# Patient Record
Sex: FEMALE | Race: WHITE
[De-identification: names, ages, dates, MRNs, and addresses within clinical notes are randomized per-mention and may not be internally consistent; named-entity substitution may affect disease eponyms.]

---

## 2017-01-27 ENCOUNTER — HOSPITAL ENCOUNTER (EMERGENCY)
Dept: HOSPITAL 18 - NAV ERS | Age: 47
Discharge: HOME | End: 2017-01-27
Payer: SELF-PAY

## 2017-01-27 DIAGNOSIS — F41.9: ICD-10-CM

## 2017-01-27 DIAGNOSIS — F17.210: ICD-10-CM

## 2017-01-27 DIAGNOSIS — R07.89: Primary | ICD-10-CM

## 2017-01-27 DIAGNOSIS — Z79.899: ICD-10-CM

## 2017-01-27 DIAGNOSIS — F31.9: ICD-10-CM

## 2017-01-27 LAB
ALP SERPL-CCNC: 103 U/L (ref 40–150)
ALT SERPL W P-5'-P-CCNC: 16 U/L (ref 0–55)
ANION GAP SERPL CALC-SCNC: 13 MMOL/L (ref 10–20)
AST SERPL-CCNC: 18 U/L (ref 5–34)
BILIRUB SERPL-MCNC: 0.2 MG/DL (ref 0.2–1.2)
BUN SERPL-MCNC: 16 MG/DL (ref 7–18.7)
CALCIUM SERPL-MCNC: 8.8 MG/DL (ref 7.8–10.44)
CHLORIDE SERPL-SCNC: 103 MMOL/L (ref 98–107)
CO2 SERPL-SCNC: 21 MMOL/L (ref 22–29)
CREAT CL PREDICTED SERPL C-G-VRATE: 0 ML/MIN (ref 70–130)
GLOBULIN SER CALC-MCNC: 2.6 G/DL (ref 2.4–3.5)
HCT VFR BLD CALC: 35 % (ref 36–47)
RBC # BLD AUTO: 3.75 MILL/UL (ref 4.2–5.4)
TROPONIN I SERPL DL<=0.01 NG/ML-MCNC: (no result) NG/ML (ref ?–0.03)
TROPONIN I SERPL DL<=0.01 NG/ML-MCNC: (no result) NG/ML (ref ?–0.03)
WBC # BLD AUTO: 8.1 THOU/UL (ref 4.8–10.8)

## 2017-01-27 PROCEDURE — 80053 COMPREHEN METABOLIC PANEL: CPT

## 2017-01-27 PROCEDURE — 96375 TX/PRO/DX INJ NEW DRUG ADDON: CPT

## 2017-01-27 PROCEDURE — 94760 N-INVAS EAR/PLS OXIMETRY 1: CPT

## 2017-01-27 PROCEDURE — 84484 ASSAY OF TROPONIN QUANT: CPT

## 2017-01-27 PROCEDURE — 93005 ELECTROCARDIOGRAM TRACING: CPT

## 2017-01-27 PROCEDURE — 82553 CREATINE MB FRACTION: CPT

## 2017-01-27 PROCEDURE — 96374 THER/PROPH/DIAG INJ IV PUSH: CPT

## 2017-01-27 PROCEDURE — 71010: CPT

## 2017-01-27 PROCEDURE — 85025 COMPLETE CBC W/AUTO DIFF WBC: CPT

## 2017-01-27 NOTE — RAD
SINGLE VIEW OF THE CHEST

1/27/17

 

INDICATION:

Chest pain.

 

IMPRESSION:  

The chest appears radiographically normal. 

 

COMMENTS: 

The lungs are clear. The cardiomediastinal silhouette is within normal limits. Osseous structures ar
e similar to a comparison dated 3/9/15.

 

POS: [facility]

## 2017-01-27 NOTE — PICIS
[facility]

                                EMERGENCY RECORD



TRIAGE (19:06 KASA)

TRIAGE NOTES:  Chest pains starts under left breast and

      radiates down left arm. (19:06 KASA)

PATIENT: NAME: Rose Mary Chapman, AGE: 46, GENDER: female, :

      Tue Sep 03, 1970, TIME OF GREET:  19:01, PREFERRED

      LANGUAGE: English, ETHNICITY: Not  or , ECODE BILLING

      MAP: [facility], SSN: [id number], Zip Code: [address], [place]

      WEIGHT: 63.50, PHONE: [phone], MEDICAL RECORD NUMBER:

      [id number], ACCOUNT NUMBER: [id number], PERSON ID: [id number], PCP:

      MD Merrill Katherine. (19:06 KASA)

COMPLAINT:  CHEST PAINS. (19:06 KASA)

ADMISSION: URGENCY: 2 Emergent, ADMISSION SOURCE: Home,

      TRANSPORT: CAR, BED: ER -03. (19:06 KASA)

PAIN: Patient complains of pain described as,

      dull, shooting, Location left breast /left

      arm, Pain is constant, Onset was 2017,

      No aggravating factors, No efforts tried to relieve symptoms. (19:22

      KASA)

SIRS SCORING: Heart Rate  (0), Temp range 96.8-101.1 (0),

      respiratory rate 12-24 (0), Mental Status altered: no (0). (19:22

      KASA)

TRIAGE SCREENING: Patient denies suicidal ideation, Patient

      denies presence of domestic violence. (19:22 KASA)

TREATMENTS IN PROGRESS: Treatments given Prehospital: Aspirin 81

      mg about 45 mins PTA. (19:22 KASA)

PROVIDERS: TRIAGE NURSE: Terri Temple RN. (19:06 KASA)

VITAL SIGNS: /74, Pulse 79, Resp 16, Pain 10, O2 Sat 99, on

      Room Air, Time 2017 19:05. (19:05 KASA)

PREVIOUS VISIT ALLERGIES: iodine, morphine (bulk), NSAIDS

      (Non-Steroidal Anti-Inflammatory Drug), Penicillins. (19:06 KASA)

  iodine, morphine (bulk), NSAIDS (Non-Steroidal Anti-Inflammatory Drug),

      Penicillins. (19:22 KASA)



KNOWN ALLERGIES

iodine

morphine (bulk): Reaction: Nausea, Source: Patient, - Headache;

      doesn't know if ever given with antinausea meds

NSAIDS (Non-Steroidal Anti-Inflammatory Drug): Reaction: Hives,

      Severity: Severe, Source: Patient, - "like I'm bouncing off the wall"

Penicillins



CURRENT MEDICATIONS

gabapentin: 

      CAPSULE : Strength - 300 mg : ORAL

      Patient Dose: 1 cap(s) Oral 3 times a day.2 caps at

      bedtime. (20:08 KASA)

carBAMazepine: 

      CAPSULE,EXTENDED RELEASE MULTIPHASE 12HR : Strength - 300 mg : ORAL

      Patient Dose: 1 cap(s) Oral 2 times a day. (20:10 KASA)

Zoloft: 



&a-1R&a+25V*p+0X*s3610N*c202B*c15G*c2P*p-0X&a-25V&a+1R         Name: Rose Mary Chapman  : 1970 F46 MedRec: [id number]

                             AcctNum: [id number]

  Prepared:  22:27 by Interface                 Page 1 of 14

                                      pMD

                         [facility]

                                EMERGENCY RECORD



      TABLET : Strength - 100 mg : ORAL

      Patient Dose: 2 tab(s) Oral once a day. (20:12 KASA)

BuSpar: 

      TABLET : Strength - 10 mg : ORAL

      Patient Dose: 15 mg Oral 3 times a day. (20:12 KASA)

TEGretol: 

      TABLET, CHEWABLE : Strength - 100 mg : ORAL

      Patient Dose: See Notes. (20:12 KASA)

traZODone: 

      TABLET : Strength - 100 mg : ORAL

      Patient Dose: 100 null Oral once a day (at bedtime). (20:12

      KASA)

Ambien: 

      TABLET : Strength - 10 mg : ORAL

      Patient Dose: 1 tab(s) Oral once a day (at bedtime). (20:13

      KASA)



VITAL SIGNS

VITAL SIGNS: BP: 137/74, Pulse: 79, Resp: 16, Pain: 10, O2 sat:

      99 on Room Air, Time: 2017 19:05. (19:05 KASA)

  BP: 130/70, Pulse: 74, Resp: 15, Pain: 10, O2 sat: 98 on Room Air, Time:

      2017 20:00. (20:00 KASA)

  BP: 123/71, Pulse: 73, Resp: 14, Pain: 10, O2 sat: 97 on Room Air, Time:

      2017 20:30. (20:30 KASA)

  BP: 132/56, Pulse: 73, Resp: 16, Pain: 9, O2 sat: 95 on Room Air, Time:

      2017 21:00. (21:00 KASA)

  BP: 122/69, Pulse: 71, Resp: 15, O2 sat: 96 on Room Air, Time: 2017

      21:30. (21:30 KASA)

  BP: 121/71, Pulse: 71, Resp: 20, Temp: 97.7 (Temporal), Pain: 6, O2 sat:

      97 on Room Air, Time: 2017 21:57. (21:57 KASA)



NURSING ASSESSMENT: CV WITH PROCEDURES (19:06 KASA)

CONSTITUTIONAL: Patient arrives ambulatory, Gait steady, History

      obtained from patient, Patient appears, in distress due to

      pain, Patient cooperative, Patient alert, Oriented to person,

      place and time, Skin warm, Skin dry, Skin normal in color, Mucous

      membranes pink, Mucous membranes moist, Patient complains of Chest

      pain, Chest pains starts under left breast and radiates down

      left arm. Started yesterday but got worse today.

CARDIOVASCULAR: Cardiovascular assessment findings include heart

      rate normal, Heart sounds normal.

RESPIRATORY/CHEST: Breath sounds clear, Respiratory assessment

      findings include respiratory effort easy, Respirations regular,

      Conversing normally, Neck and chest exam findings include trachea

      midline, Chest expansion equal, Chest movement symmetrical, no signs

      of distress, no associated cough noted, no associated fever.

IV: IV established, to the right antecubital, using a 20 gauge

      catheter, in one attempt, IV site prepped with chloraprep, Saline

      lock established, Flushed with normal saline (mls): 10, Labs drawn at

      time of placement, labeled in the presence of the patient and sent to



&a-1R&a+25V*p+0X*w0102V*c202B*c15G*c2P*p-0X&a-25V&a+1R         Name: AdelaHerimakeda WAHL  : 1970 F46 MedRec: [id number]

                             AcctNum: [id number]

  Prepared:  22:27 by Interface                 Page 2 of 14

                                      pMD

                         [facility]

                                EMERGENCY RECORD



      lab, Notes: Established by ERMA Penny

      1 unsuccessful attempt by ERMA Murray.

CARDIAC MONITOR: Cardiac monitoring indicated for complaint of

      chest pain, Patient placed on cardiac monitor, Heart rate:

      79, Patient placed on non-invasive blood pressure monitor,

      with disposable blood pressure cuff applied, Patient placed on

      continuous pulse oximetry, Adult/pediatric oxisensor applied, Oxygen

      saturation 100%.

EKG: EKG indicated for complaint of chest pain, 12 lead EKG

      performed on the left chest, done by first MUKESH Ahumada.



NURSING PROCEDURE: BEDSIDE RADIOLOGY

PATIENT IDENTIFIER: Patient actively involved in identification

      process, Patient's identity verified by patient stating name,

      Patient's identity verified by patient stating birth date. (19:51

      KASA)

  Patient actively involved in identification process. (20:00 KHER)

BEDSIDE RADIOLOGY: Bedside radiology performed by Jesi, Portable

      chest x-ray performed. (19:51 KASA)

  Portable chest x-ray performed. (20:00 KHER)

SAFETY: Side rails up, Cart/Stretcher in lowest position, Family

      at bedside, Call light within reach, Hospital ID band on. (19:51

      KASA)



NURSING PROCEDURE: DISCHARGE NOTE (22:00 KASA)

DISCHARGE: Patient discharged to home, in a wheelchair,

      family driving, accompanied by /wife/partner, Summary of Care

      printed/ provided, Discharge instructions given to patient, Discharge

      instructions given to WS Heimdal Jr, SO/, Simple or moderate

      discharge teaching performed, . Educated and provided handout

      regarding diagnosis of:

      Atypical chest pain

      Follow up with PCP, Prescriptions given and instructions on side

      effects given, Name of prescription(s) given: Flexeril, Above

      person(s) verbalized understanding of discharge instructions and

      follow-up care, Patient treated and evaluated by physician, Notes:

      Patient ambulated to restroom prior to leaving department.

      Steady gait. RR even and unlabored. NAD>.

BELONGINGS: Belongings and valuables with patient upon arrival to

      the Emergency Department include:, Belongings and valuables with

      patient at time of discharge include:, Belongings remain with

      patient, Valuables remain with patient.

SAFETY: Side rails up, Cart/Stretcher in lowest position, Family

      at bedside, Call light within reach, Hospital ID band on.



NURSING PROCEDURE: IV (21:57 KASA)

FOLLOW-UP SITE 1: IV discontinued, due to patient being

      discharged, catheter intact, Notes: IV discontinued. Tip intact.

      Pressure applied along with 2x2 and tape. Patient tolerated procedure

      well.



&a-1R&a+25V*p+0X*a7628A*c202B*c15G*c2P*p-0X&a-25V&a+1R         Name: Rose Mary Chapman  : 1970 F46 MedRec: [id number]

                             AcctNum: [id number]

  Prepared:  22:27 by Interface                 Page 3 of 14

                                      pMD

                         [facility]

                                EMERGENCY RECORD



SAFETY: Side rails up, Cart/Stretcher in lowest position, Family

      at bedside, Call light within reach, Hospital ID band on.



NURSING PROCEDURE: LAB DRAW (20:55 KASA)

PATIENT IDENTIFIER: Patient actively involved in identification

      process, Patient's identity verified by patient stating name,

      Patient's identity verified by patient stating birth date.

LAB DRAW: Lab draw indicated for obtaining specimens for

      evaluation, Subsequent lab draw performed, from vascular access

      device, existing IV site, R AC, After labs drawn, device flushed with

      saline, amount (mL) 10, Lab specimens labeled in the presence of the

      patient and sent to lab.

SAFETY: Side rails up, Cart/Stretcher in lowest position, Family

      at bedside, Call light within reach, Hospital ID band on.



NURSING PROCEDURE: TEACHING (22:00 KASA)

TEACHING: Prescriptions given and instructions on side effects

      given, Name of prescription(s) given: FLEXERIL (CYCLOBENZAPRINE) is a

      muscle relaxer. It is used to treat muscle pain, spasms, and

      stiffness. SIDE EFFECTS THAT YOU SHOULD REPORT TO YOUR DOCTOR OR

      HEALTH CARE PROFESSIONAL AS SOON AS POSSIBLE: allergic reactions like

      skin rash, itching or hives, swelling of the face, lips, or tongue,

      chest pain, fast heartbeat, hallucinations, seizures, vomiting. SIDE

      EFFECTS THAT USUALLY DO NOT REQUIRE MEDICAL ATTENTION (REPORT TO YOUR

      DOCTOR OR HEALTH CARE PROFESSIONAL IF THEY CONTINUE OR ARE

      BOTHERSOME): Headache., Notes: Additional information about the

      medication you were given and/or prescribed.

      Tell your doctor or healthcare professional if your symptoms do not

      start to get better or if they get worse.

      YOU MAY GET DROWSY OR DIZZY. DO NOT DRIVE, USE MACHINERY, OR DO

      ANYTHING THAT NEEDS MENTAL ALERTNESS UNTIL YOU KNOW HOW THIS MEDICINE

      AFFECTS YOU. DO NOT STAND OR SIT UP QUICKLY, ESPECIALLY IF YOU ARE AN

      OLDER PATIENT. THIS REDUCES THE RISK OF DIZZY OR FAINTING SPELLS.

      ALCOHOL MAY INTERFERE WITH THE EFFECT OF THIS MEDICINE. AVOID

      ALCOHOLIC DRINKS.

      Your mouth may get dry. Drinking water, chewing sugarless gum, or

      sucking on hard candy may help.

      How to take:

      Take this medicine by mouth with a glass of water. Follow the

      directions on the prescription label. Do not cut, crush or chew this

      medicine. If this medicine upsets your stomach, take it with food or

      milk. Take your medicine at regular intervals. Do not take it more

      often than directed.

      If you miss a dose, take it as soon as you can. If it is almost time

      for your next dose, take only that dose. Do not take double or extra

      doses.

      Let your health care provided know if you have any of these

      conditions:

         heart disease

         irregular heartbeat



&a-1R&a+25V*p+0X*j3016C*c202B*c15G*c2P*p-0X&a-25V&a+1R         Name: Rose Mary Chapman  : 1970 F46 MedRec: [id number]

                             AcctNum: [id number]

  Prepared:  22:27 by Interface                 Page 4 of 14

                                      pMD

                         [facility]

                                EMERGENCY RECORD



         liver disease

         past heart attack

         thyroid problem

       an unusual or allergic reaction to cyclobenzaprine, tricyclic

      antidepressants, lactose, other medicines, foods, dyes, or

      preservatives

         pregnant or trying to get pregnant

         breast-feeding

      What may interact with this medicine?

      Do not take this medicine with any of the following medications:

       certain medicines for fungal infections like fluconazole,

      itraconazole, ketoconazole, posaconazole, voriconazole

         cisapride

         dofetilide

         dronedarone

         droperidol

         flecainide

         grepafloxacin

         halofantrine

         levomethadyl

       MAOIs like Carbex, Eldepryl, Marplan, Nardil, and Parnate

         nilotinib

         pimozide

         probucol

         sertindole

         thioridazine

         ziprasidone

      This medicine may also interact with the following medications:

         abarelix

         alcohol

         certain medicines for cancer

       certain medicines for depression, anxiety, or psychotic disturbances

       certain medicines for infection like alfuzosin, chloroquine,

      clarithromycin, levofloxacin, mefloquine, pentamidine, troleandomycin

         certain medicines for irregular heart beat

       certain medicines used for sleep or numbness during surgery or

      procedure

         contrast dyes

         dolasetron

         guanethidine

         methadone

         octreotide

         ondansetron

       other medicines that prolong the QT interval (cause an abnormal

      heart rhythm)

         palonosetron

       phenothiazines like chlorpromazine, mesoridazine, prochlorperazine,

      thioridazine

         tramadol

         vardenafil



&a-1R&a+25V*p+0X*r8013D*c202B*c15G*c2P*p-0X&a-25V&a+1R         Name: Rose Mary Chapman  : 1970 F46 MedRec: [id number]

                             AcctNum: [id number]

  Prepared:  22:27 by Interface                 Page 5 of 14

                                      pMD

                         [facility]

                                EMERGENCY RECORD



      This list may not describe all possible interactions. Give your

      health care provider a list of all the medicines, herbs,

      non-prescription drugs, or dietary supplements you use. Also tell

      them if you smoke, drink alcohol, or use illegal drugs. Some items

      may interact with your medicine.

      PATIENT &/OR CAREGIVER VERBALIZED UNDERSTANDING OF THE TEACHING

      PROVIDED AND WAS ABLE TO DEMONSTRATE TEACHING AS EVIDENCED BY TEACH

      BACK.

  Simple or moderate teaching performed, by ERMA Melgoza, Chest Pain,

      Uncertain Cause

      Based on your exam today, the exact cause of your chest pain is not

      certain. Your condition does not seem serious at this time, and your

      pain does not appear to be coming from your heart.

      However, sometimes the signs of a serious problem take more time to

      appear. Therefore, watch for the warning signs listed below.

      Home Care:

         Rest today and avoid strenuous activity.

         Take any prescribed medicine as directed.

      Follow Up with your doctor or this facility as instructed or if you

      do not start to feel better within 24 hours.

      [NOTE: If an X-ray or EKG (cardiogram) was made, it will be reviewed

      by another specialist. You will be notified of any new findings that

      may affect your care.]

      Get Prompt Medical Attention if any of the following occur:

       A change in the type of pain: if it feels different, becomes more

      severe, lasts longer, or begins to spread into your shoulder, arm,

      neck, jaw or back

       Shortness of breath or increased pain with breathing

         Weakness, dizziness, or fainting

       Cough with dark colored sputum (phlegm) or blood

       Fever of 100.4F (38C) or higher, or as directed by your healthcare

      provider

         Swelling, pain or redness in one leg.



ORDER DETAILS



      Order Name: CARDIAC MONITOR ED, Status: Done, Time: 19:39 2017,

      User: [email or web address],

       - Ordered for: MD Forrester Stanley,

       - Entered by: MD Forrester Stanley - Fri 2017 19:37,

       - Quantity: 1,

      Order Name: Cardiac Profile w/CKMB & Troponin - I, Status: Active,

      Time: 19:37 2017, User: GIA,

       - Ordered for: MD Forrester Stanley,

       - Entered by: MD Forrester Stanley - Fri 2017 19:37,

       - Quantity: 1,

      Order Name: CBC with Differential, Status: Active, Time: 19:37

      2017, User: GIA,

       - Ordered for: MD Forrester Stanley,

       - Entered by: MD Forrester Stanley - Fri 2017 19:37,



&a-1R&a+25V*p+0X*f7492F*c202B*c15G*c2P*p-0X&a-25V&a+1R         Name: Parcelas Mandry 
Rose Mary VISH  : 1970 F46 MedRec: [id number]

                             AcctNum: [id number]

  Prepared:  22:27 by Interface                 Page 6 of 14

                                      pMD

                         [facility]

                                EMERGENCY RECORD



       - Quantity: 1,

      Order Name: Comprehensive Metabolic Panel, Status: Active, Time:

      19:37 2017, User: GIA,

       - Ordered for: MD Forrester Stanley,

       - Entered by: MD Forrester Stanley - Fri 2017 19:37,

       - Quantity: 1,

      Order Name: EKG 12 Lead in Emergency Room, Status: Active, Time:

      19:37 2017, User: GIA,

       - Ordered for: MD Forrester Stanley,

       - Entered by: MD Forrester Stanley - Fri 2017 19:37,

       - Quantity: 1,

      Order Name: ERRT Pulse Oximeter ER, Status: Active, Time: 19:37

      2017, User: GIA,

       - Ordered for: MD Forrester Stanley,

       - Entered by: MD Forrester Stanley - Fri 2017 19:37,

       - Quantity: 1,

      Order Name: SALINE LOCK, Status: Done, Time: 19:39 2017, User:

      RICKY,

       - Ordered for: MD Forrester Stanley,

       - Entered by: MD Forrester Stanley - Fri 2017 19:37,

       - Quantity: 1,

      Order Name: XR Chest 1 View Portable, Status: Active, Time: 19:37

      2017, User: GIA,

       - Ordered for: MD Forrester Stanley,

       - Entered by: MD Forrester Stanley - Fri 2017 19:37,

       - Quantity: 1.



MEDICATION ADMINISTRATION SUMMARY



      Drug Name: fentaNYL (PF) intravenous, Dose Ordered: 100 mcg, Route:

      IV Push, Status: Given, Time: 21:28 2017,

      Drug Name: *Norco, Dose Ordered: 2 tab(s), Route: Oral, Status:

      Given, Time: 20:28 2017,

      Drug Name: GI COCKTAIL, Dose Ordered: 40 mL, Route: Oral, Status:

      Given, Time: 20:06 2017,

      Drug Name: Phenergan injection, Dose Ordered: 12.5 mg, Route: IV

      Push, Status: Given, Time: 19:45 2017, *Additional information

      available in notes, Detailed record available in Medication Service

      section.



MEDICATION SERVICE

fentaNYL (PF) intravenous:  Order: fentaNYL (PF) intravenous

      (fentanyl citrate/preservative free) - Dose: 100 mcg : IV

      Push

      Schedule: Now

      Ordered by: Eric Forrester MD

      Entered by: Eric Forrester MD  21:23 ,

      Acknowledged by: Terri Temple RN  21:27

      Documented as given by: Terri Temple RN  21:28

      Patient, Medication, Dose, Route and Time verified prior to



&a-1R&a+25V*p+0X*j4208T*c202B*c15G*c2P*p-0X&a-25V&a+1R         Name: Rose Mary Chapman  : 1970 F46 MedRec: [id number]

                             AcctNum: [id number]

  Prepared:  22:27 by Interface                 Page 7 of 14

                                      pMD

                         [facility]

                                EMERGENCY RECORD



      administration.

       Amount given: 100 mcg, IV SITE #1 IVP, subsequent different

      medication, Slowly, Catheter placement confirmed via flush prior to

      administration, IV site without signs or symptoms of infiltration

      during medication administration, No swelling during administration,

      No drainage during administration, IV flushed after administration,

      Correct patient, time, route, dose and medication confirmed prior to

      administration, Patient advised of actions and side-effects prior to

      administration, Allergies confirmed and medications reviewed prior to

      administration, Patient in position of comfort, Side rails up, Cart

      in lowest position, Family at bedside.

GI COCKTAIL:  Order: GI COCKTAIL - Dose: 40 mL : Oral

      ** Lidocaine Viscous (lidocaine HCl) [10 mL]

      ** MAG-AL (magnesium hydroxide/aluminum hydroxide) [30 mL]

      Ordered by: Eric Forrester MD

      Entered by: Eric Forrester MD  19:38 ,

      Acknowledged by: Terri Temple RN  19:39

      Documented as given by: Terri Temple RN  20:06

      Patient, Medication, Dose, Route and Time verified prior to

      administration.

       Amount given: 40 ml, Site: Medication administered P.O., Correct

      patient, time, route, dose and medication confirmed prior to

      administration, Patient advised of actions and side-effects prior to

      administration, Allergies confirmed and medications reviewed prior to

      administration, Patient in position of comfort, Side rails up, Cart

      in lowest position, Family at bedside.

: Follow Up : No signs or symptoms of allergic reaction noted,

      No change in pain, No change in vomiting, Advised not to ambulate

      without assistance, Patient in position of comfort, Side rails up,

      Cart in lowest position, Family at bedside, Patient states pain has

      not changed. Feels like medication did not help. ERMD informed and

      additional medication requested. (20:18 KASA)

Norco:  Order: Norco (hydrocodone bitartrate/acetaminophen) -

      Dose: 2 tab(s) : Oral

      POTENTIAL ALLERGY REACTION: 'morphine (bulk) [morphine/morphine

      sulfate]' - Benefits outweigh risks

      Schedule: Now

      Notes: 2 

      Ordered by: Eric Forrester MD

      Entered by: Eric Forrester MD  20:21 ,

      Acknowledged by: Terri Temple RN  20:25

      Documented as given by: Terri Temple RN  20:28

      Patient, Medication, Dose, Route and Time verified prior to

      administration.

       Amount given: 2 tabs, Site: Medication administered P.O., Correct

      patient, time, route, dose and medication confirmed prior to

      administration, Patient advised of actions and side-effects prior to

      administration, Allergies confirmed and medications reviewed prior to

      administration, Patient in position of comfort, Side rails up, Cart

      in lowest position, Family at bedside.



&a-1R&a+25V*p+0X*t0310R*c202B*c15G*c2P*p-0X&a-25V&a+1R         Name: Rose Mary Chapman  : 1970 F46 MedRec: [id number]

                             AcctNum: [id number]

  Prepared:  22:27 by Interface                 Page 8 of 14

                                      pMD

                         [facility]

                                EMERGENCY RECORD



Phenergan injection:  Order: Phenergan injection (promethazine

      HCl) - Dose: 12.5 mg : IV Push

      Schedule: Now

      Ordered by: Eric Forrester MD

      Entered by: Eric Forrester MD  19:37 ,

      Acknowledged by: Terri Temple RN  19:39

      Documented as given by: Terri Temple RN  19:45

      Patient, Medication, Dose, Route and Time verified prior to

      administration.

       Amount given: 12.5 mg, Amount wasted: 12.5 mg, IV SITE #1 IVP,

      initial medication, Slowly, Catheter placement confirmed via flush

      prior to administration, IV site without signs or symptoms of

      infiltration during medication administration, No swelling during

      administration, No drainage during administration, IV flushed after

      administration, Correct patient, time, route, dose and medication

      confirmed prior to administration, Patient advised of actions and

      side-effects prior to administration, Allergies confirmed and

      medications reviewed prior to administration, Patient in position of

      comfort, Side rails up, Cart in lowest position, Family at bedside,

      0.5 ml diluted with 10 ml of NS.

: Follow Up : No signs or symptoms of allergic reaction noted,

      No change in pain, _IV SITE #1:_, Medication infusion discontinued,

      on  19:48, 5 minutes, ., Total amount infused:

      12.5 mg/ 10.5 ml, IV Line flushed after administration,

      Advised not to ambulate without assistance, Patient in position of

      comfort, Side rails up, Cart in lowest position, Family at bedside,

      Patient denies any burning, pain or discomfort with slow medication

      push and NS flush. (19:48 KASA)



HPI CHEST PAIN (19:56 SHAN)

CHIEF COMPLAINT: Patient presents for evaluation of chest

      pain, ongoing.

HISTORIAN: History provided by patient, History provided by

      patient's spouse, chest pain for about 2 hours yesterday that

      abated; then returned about 2 hrs prior to arrival today and ongoing.

      From upper center chest to neck to left arm. Has been under a lot of

      stress as well. Hx of bipolar affective disorder and on a lot of

      medications.

QUALITY: Pain is sharp in nature, described as

      stabbing.

RELIEVED BY: Patient's condition relieved by nothing.

HEART SCORE: Patients history is Slightly Suspicious

      (0), Patients ECG is normal (0), Patients age is greater

      than 45 and less than 65 (1), Patient has no risk factors known

      (0), Patients Troponin is equal to or less than 1 times the normal

      limit (0), Total 1.



ROS (19:59 SHAN)

CONSTITUTIONAL: Negative constitutional review of systems.

EYES: Negative eye review of systems.



&a-1R&a+25V*p+0X*k2983T*c202B*c15G*c2P*p-0X&a-25V&a+1R         Name: Rose Mary Chapman  : 1970 F46 MedRec: [id number]

                             AcctNum: [id number]

  Prepared:  22:27 by Interface                 Page 9 of 14

                                      pMD

                         [facility]

                                EMERGENCY RECORD



ENT: Negative ears, nose, throat review of systems.

CARDIOVASCULAR: Negative cardiovascular review of systems.

RESPIRATORY: Negative respiratory review of systems.

GI: Negative gastrointestinal review of systems.

GENITOURINARY FEMALE: Negative genitourinary review of systems.

MUSCULOSKELETAL: Negative musculoskeletal review of systems.

SKIN: Negative skin review of systems.

NEUROLOGIC: Negative neurologic review of systems.

ENDOCRINE: Negative endocrine review of systems.

NOTES: All systems reviewed, negative except as described above.



PAST MEDICAL HISTORY (19:22 KASA)

MEDICAL HISTORY:  Flu vaccine not up to date, Tetanus

      immunization up to date, Pneumococcal vaccine not up to date, Past

      medical history includes musculoskeletal disorder, chronic back pain,

      spondylolisthesis, history of infectious disease: methicillin

      resistant staphylococcus aureus.

FEMALE SURGICAL HISTORY:  Surgical history of carpal tunnel

      surgery, RIGHT HAND, Surgical history of hysterectomy, Surgical

      history of orthopedic surgery, LEFT THUMB, Surgical history of

      tonsillectomy.

PSYCHIATRIC HISTORY:  Psychiatric history includes, anxiety,

      bipolar disorder, depression, MOOD AND SLEEP DISORDER,.

SOCIAL HISTORY: Patient is a former drug user, Patient

      currently uses tobacco, smokes cigarettes, daily, Patient

      has smoked for 30 years, Patient smokes 1 pack per

      day, Lives at home, with family, Patient denies alcohol

      use, Patient currently uses drugs, abuses marijuana,.



PHYSICAL EXAM (19:59 SHAN)

CONSTITUTIONAL: Patient afebrile, Pulse normal, Blood pressure

      normal, Respiratory rate normal, Patient appears non toxic, Patient

      appears pain free, Patient alert and oriented to person, place and

      time.

HEAD: Head exam included findings of head atraumatic,

      normocephalic.

EYES: Eye exam normal, Eye exam included findings of eyelids

      normal to inspection, Pupils equally round and reactive to light,

      Extraocular muscles intact.

ENT: ENT exam normal, Pharynx exam normal, Uvula exam normal,

      Tonsil exam normal.

NECK: Neck exam normal, Neck exam included findings of normal

      range of motion, Trachea midline.

RESPIRATORY CHEST: Respiratory and chest exam normal, Chest exam

      included findings of chest movement symmetrical, Chest expansion

      equal, Percussion normal.

CARDIOVASCULAR: Cardiovascular assessment normal, Cardiovascular

      exam included findings of heart rate regular rate and rhythm, Heart

      sounds normal.

ABDOMEN FEMALE: Abdominal exam normal, Abdominal exam included



&a-1R&a+25V*p+0X*m0981O*c202B*c15G*c2P*p-0X&a-25V&a+1R         Name: Rose Mary Chapman  : 1970 F46 MedRec: [id number]

                             AcctNum: [id number]

  Prepared:  22:27 by Interface                 Page 10 of 14

                                      pMD

                         [facility]

                                EMERGENCY RECORD



      findings of abdomen nontender, Bowel sounds normal.

BACK: Back exam normal.

UPPER EXTREMITY: Upper extremity exam normal, Upper extremity

      exam included findings of inspection normal, Range of motion normal.

LOWER EXTREMITY: Lower extremity exam normal, Lower extremity

      exam included findings of inspection normal, Range of motion normal.

NEURO: Neuro exam normal.

SKIN: Skin exam normal.

PSYCHIATRIC: Psychiatric exam normal, Psychiatric exam included

      findings of patient oriented to person place and time, Normal affect,

      Judgment normal, Insight normal.



EVENTS

TRANSFER:  Triage to Emergency Emergency Room -03. ( 19:06 RICKY)

   Removed from Emergency Emergency Room -03. (22:18 RICKY)



DOCTOR NOTES

TEXT:  Normally healthy adult female with bipolar illness,

      having sharp and variable chest pain. Has been under a lot of stress.

      Pain central and into neck and left arm, but sharp and variable. Had

      it about 2 hours yesterday, resolved, then returned about 2 hours

      prior to arrival here. (19:59 SHAN)

   Extensive workup negative. Pain increased with motion of the left

      arm and shoulder. Some radicular nature; may need evaluation for

      cervical radiculopathy. (21:51 SHAN)

DATA REVIEWED: Lab data reviewed, Xray data reviewed, Reviewed

      EKG. (21:51 SHAN)



PROBLEM LIST

  No recorded problems



DIAGNOSIS (21:53 SHAN)

FINAL: PRIMARY: atypical chest pain.



DISPOSITION

PATIENT:  Disposition Type: Discharge, Disposition: *Discharge

      Home. (21:53 SHAN)

   Patient left the department. (22:18 RICKY)



INSTRUCTION (21:55 SHAN)

DISCHARGE:  ATYPICAL CHEST PAIN UNKNOWN CAUSE.

FOLLOWUP:  MD Desirae, [place], [facility] [place], [address],

      [address], [facility] [address], [phone].

SPECIAL:  1. followup with regular provider

      2. if the pain continues to run down the left arm; an mri of c-spine

      might be indicated and possible other cardiac tests

      3. return if problem worsens

      4. try the flexeril as a muscle relaxant; but don't use others with

      it (the skelaxin that is on the records).



&a-1R&a+25V*p+0X*d1537Y*c202B*c15G*c2P*p-0X&a-25V&a+1R         Name: Rose Mary Chapman  : 1970 F46 MedRec: [id number]

                             AcctNum: [id number]

  Prepared:  22:27 by Interface                 Page 11 of 14

                                      pMD

                         [facility]

                                EMERGENCY RECORD





PRESCRIPTION (21:54 SHAN)

Flexeril:  TABLET : 10 mg : ORAL : Quantity: *** 1 *** Unit:

      tab(s) Route: ORAL Schedule: every 8 hours PRN Dispense: *** 50 ***

      May substitute. Refills: *** No Refills ***.

NOTES:  No Refills.



IMAGING (20:24 KASA)

*EKG:  Image captured from scanner.



ADMIN (21:55 SHAN)

DIGITAL SIGNATURE:  MD Forrester Stanley.



RESULTS

RADIOLOGY: XR Chest 1 View Portable Observe DT: 

      19:38,

      CXRP

      SINGLE VIEW OF THE CHEST

      17



      INDICATION:

      Chest pain.



      IMPRESSION:

      The chest appears radiographically normal.



      COMMENTS:

      The lungs are clear. The cardiomediastinal silhouette is within

      normal limits. Osseous structures ar

      e similar to a comparison dated 3/9/15.



      POS: [facility]

       . (21:12 GIA)

LABORATORY: Comprehensive Metabolic Panel Collection DT:  19:37, 

      *Sodium 133 - L mmol/L, Range (136-145),

      Potassium 3.8 mmol/L, Range (3.5-5.1),

      Chloride 103 mmol/L, Range (), 

      *Carbon Dioxide 21 - L mmol/L, Range (22-29),

      Anion Gap 13 mmol/L, Range (10-20),

      BUN (Urea Nitrogen) 16 mg/dL, Range (7.0-18.7),

      Creatinine 0.92 mg/dL, Range (0.6-1.1),

      Estimated GFR-MDRD 65 , Reference Range for Estimated GFR:

       Greater than 90, mL/min/1.73 m2



      NOTE:

      The MDRD equation has not been validated for use, with the

      elderly (over 70 years of age), pregnant women, patients

      with, serious comorbid condition or persons with extremes of

      body size, muscle, mass, or nutritional status. ,



&a-1R&a+25V*p+0X*z7680D*c202B*c15G*c2P*p-0X&a-25V&a+1R         Name: Rose Mary Chapman  : 1970 F46 MedRec: [id number]

                             AcctNum: [id number]

  Prepared:  22:27 by Interface                 Page 12 of 14

                                      pMD

                         [facility]

                                EMERGENCY RECORD



      Glucose 94 mg/dL, Range (),

      Calcium 8.8 mg/dL, Range (7.8-10.44),

      Bilirubin, Total 0.2 mg/dL, Range (0.2-1.2),

      Protein, Total 6.6 g/dL, Range (6.0-8.3), NOTE: Plasma values are

      generally 0.3 to 0.5 g/dL higher

      than serum values, due to the presence of fibrinogen. ,

      Albumin 4.0 g/dL, Range (3.5-5.0),

      Globulin 2.6 g/dL, Range (2.4-3.5),

      Alb/Glob Ratio 1.5 g/dL, Range (1.2-2.2),

      Alkaline Phosphatase 103 U/L, Range (),

      AST (SGOT) 18 U/L, Range (5-34),

      ALT (SGPT) 16 U/L, Range (0-55). (19:56 GIA)

  Cardiac Profile w/CKMB & TropI Collection DT:  19:37,

      CKMB 2.0 ng/mL, Range (0-6.6),

      Troponin I Less than 0.010 ng/mL, Range (< 0.028),

      ********************* Reference Range **********************



       , 0.00 - 0.028 ng/mL Negative

       0.029 - 0.29 ng/mL , Indeterminate

       Greater or Equal to 0.3 ng/mL Strongly suggests MI

       , . (20:05 GIA)

  CBC with Differential Collection DT:  19:37,

      White Blood Cell (WBC) Count 8.1 thou/uL, Range (4.8-10.8), 

      *Red Blood Cell (RBC) Count 3.75 - L mill/uL, Range (4.20-5.40),

      

      *Hemoglobin 11.9 - L g/dL, Range (12.0-16.0), 

      *Hematocrit 35.0 - L %, Range (36.0-47.0),

      Mean Corpuscular Volume 93.3 fl, Range (81.0-99.0), 

      *Mean Corpuscular Hemoglobin 31.6 - H pg, Range (27.0-31.0),

      Mean Corpuscular HGB CONC 33.9 g/dL, Range (32.0-36.0),

      RBC Distribution Width 11.6 %, Range (11.5-14.5),

      Platelet Count 233 thou/uL, Range (130-400), 

      *Mean Platelet Volume 5.7 - L fL, Range (7.4-10.4),

      Neutrophil 61 %, Range (42-75), 

      *Band 2 - L %, Range (5-11),

      Lymphocytes 29 %, Range (21-51),

      Monocytes 6 %, Range (0-10),

      Eosinophils 1 %, Range (0-10),

      Basophils 1 %, Range (0-2),

      PLT Morphology Comment Appears Adequate ,

      RBC Morphology Normal . (20:11 SHAN)

  CK (CPK) Collection DT:  20:58,

      CK (CPK) 164 U/L, Range (). (21:24 SHAN)

  Cardiac Profile w/CKMB & TropI Collection DT:  20:58,

      CKMB 2.0 ng/mL, Range (0-6.6),

      Troponin I Less than 0.010 ng/mL, Range (< 0.028),

      ********************* Reference Range **********************



       , 0.00 - 0.028 ng/mL Negative

       0.029 - 0.29 ng/mL , Indeterminate



&a-1R&a+25V*p+0X*k4213S*c202B*c15G*c2P*p-0X&a-25V&a+1R         Name: Rose Mary Chapman  : 1970 F46 MedRec: [id number]

                             AcctNum: [id number]

  Prepared:  22:27 by Interface                 Page 13 of 14

                                      pMD

                         [facility]

                                EMERGENCY RECORD



       Greater or Equal to 0.3 ng/mL Strongly suggests MI

       , . (21:33 SHAN)

Key:

  RICKY=ERMA Temple, Terri LOPES=RITA Lacy Kayce SHAN=MD Usama, Eric































































































&a-1R&a+25V*p+0X*f7598I*c202B*c15G*c2P*p-0X&a-25V&a+1R         Name: Rose Mary Chapman  : 1970 F46 MedRec: [id number]

                             AcctNum: [id number]

  Prepared:  22:27 by Interface                 Page 14 of 14

                                      pMD

MTDD

## 2017-01-27 NOTE — [facility]RECORD
[facility]

                                [facility]



HPI CHEST PAIN (19:56 SHAN)

CHIEF COMPLAINT: Patient presents for evaluation of chest

      pain, ongoing.

HISTORIAN: History provided by patient, History provided by

      patient's spouse, chest pain for about 2 hours yesterday that

      abated; then returned about 2 hrs prior to arrival today and ongoing.

      From upper center chest to neck to left arm. Has been under a lot of

      stress as well. Hx of bipolar affective disorder and on a lot of

      medications.

QUALITY: Pain is sharp in nature, described as

      stabbing.

RELIEVED BY: Patient's condition relieved by nothing.

HEART SCORE: Patients history is Slightly Suspicious

      (0), Patients ECG is normal (0), Patients age is greater

      than 45 and less than 65 (1), Patient has no risk factors known

      (0), Patients Troponin is equal to or less than 1 times the normal

      limit (0), Total 1.



ROS (19:59 SHAN)

CONSTITUTIONAL: Negative constitutional review of systems.

EYES: Negative eye review of systems.

ENT: Negative ears, nose, throat review of systems.

CARDIOVASCULAR: Negative cardiovascular review of systems.

RESPIRATORY: Negative respiratory review of systems.

GI: Negative gastrointestinal review of systems.

GENITOURINARY FEMALE: Negative genitourinary review of systems.

MUSCULOSKELETAL: Negative musculoskeletal review of systems.

SKIN: Negative skin review of systems.

NEUROLOGIC: Negative neurologic review of systems.

ENDOCRINE: Negative endocrine review of systems.

NOTES: All systems reviewed, negative except as described above.



PAST MEDICAL HISTORY (19:22 KASA)

MEDICAL HISTORY:  Flu vaccine not up to date, Tetanus

      immunization up to date, Pneumococcal vaccine not up to date, Past

      medical history includes musculoskeletal disorder, chronic back pain,

      spondylolisthesis, history of infectious disease: methicillin

      resistant staphylococcus aureus.

FEMALE SURGICAL HISTORY:  Surgical history of carpal tunnel

      surgery, RIGHT HAND, Surgical history of hysterectomy, Surgical

      history of orthopedic surgery, LEFT THUMB, Surgical history of

      tonsillectomy.

PSYCHIATRIC HISTORY:  Psychiatric history includes, anxiety,

      bipolar disorder, depression, MOOD AND SLEEP DISORDER,.

SOCIAL HISTORY: Patient is a former drug user, Patient

      currently uses tobacco, smokes cigarettes, daily, Patient

      has smoked for 30 years, Patient smokes 1 pack per

      day, Lives at home, with family, Patient denies alcohol

      use, Patient currently uses drugs, abuses marijuana,.





&a-1R&a+25V*p+0X*y4448N*c202B*c15G*c2P*p-0X&a-25V&a+1R         Name: Rose Mary Chapman  : 1970 F46 MedRec: [id number]

                             AcctNum: [id number]

  Prepared:  22:21 by Interface                 Page 1 of 4

                                      pMD

                         [facility]

                                EMERGENCY RECORD



KNOWN ALLERGIES

iodine

morphine (bulk): Reaction: Nausea, Source: Patient, - Headache;

      doesn't know if ever given with antinausea meds

NSAIDS (Non-Steroidal Anti-Inflammatory Drug): Reaction: Hives,

      Severity: Severe, Source: Patient, - "like I'm bouncing off the wall"

Penicillins



CURRENT MEDICATIONS

gabapentin: 

      CAPSULE : Strength - 300 mg : ORAL

      Patient Dose: 1 cap(s) Oral 3 times a day.2 caps at

      bedtime. (20:08 KASA)

carBAMazepine: 

      CAPSULE,EXTENDED RELEASE MULTIPHASE 12HR : Strength - 300 mg : ORAL

      Patient Dose: 1 cap(s) Oral 2 times a day. (20:10 KASA)

Zoloft: 

      TABLET : Strength - 100 mg : ORAL

      Patient Dose: 2 tab(s) Oral once a day. (20:12 KASA)

BuSpar: 

      TABLET : Strength - 10 mg : ORAL

      Patient Dose: 15 mg Oral 3 times a day. (20:12 KASA)

TEGretol: 

      TABLET, CHEWABLE : Strength - 100 mg : ORAL

      Patient Dose: See Notes. (20:12 KASA)

traZODone: 

      TABLET : Strength - 100 mg : ORAL

      Patient Dose: 100 null Oral once a day (at bedtime). (20:12

      KASA)

Ambien: 

      TABLET : Strength - 10 mg : ORAL

      Patient Dose: 1 tab(s) Oral once a day (at bedtime). (20:13

      KASA)



VITAL SIGNS

VITAL SIGNS: BP: 137/74, Pulse: 79, Resp: 16, Pain: 10, O2 sat:

      99 on Room Air, Time: 2017 19:05. (19:05 KASA)

  BP: 130/70, Pulse: 74, Resp: 15, Pain: 10, O2 sat: 98 on Room Air, Time:

      2017 20:00. (20:00 KASA)

  BP: 123/71, Pulse: 73, Resp: 14, Pain: 10, O2 sat: 97 on Room Air, Time:

      2017 20:30. (20:30 KASA)

  BP: 132/56, Pulse: 73, Resp: 16, Pain: 9, O2 sat: 95 on Room Air, Time:

      2017 21:00. (21:00 KASA)

  BP: 122/69, Pulse: 71, Resp: 15, O2 sat: 96 on Room Air, Time: 2017

      21:30. (21:30 KASA)

  BP: 121/71, Pulse: 71, Resp: 20, Temp: 97.7 (Temporal), Pain: 6, O2 sat:

      97 on Room Air, Time: 2017 21:57. (21:57 KASA)



PHYSICAL EXAM (19:59 SHAN)

CONSTITUTIONAL: Patient afebrile, Pulse normal, Blood pressure



&a-1R&a+25V*p+0X*c5000M*c202B*c15G*c2P*p-0X&a-25V&a+1R         Name: Rose Mary Chapman  : 1970 F46 MedRec: [id number]

                             AcctNum: [id number]

  Prepared:  22:21 by Interface                 Page 2 of 4

                                      pMD

                         [facility]

                                EMERGENCY RECORD



      normal, Respiratory rate normal, Patient appears non toxic, Patient

      appears pain free, Patient alert and oriented to person, place and

      time.

HEAD: Head exam included findings of head atraumatic,

      normocephalic.

EYES: Eye exam normal, Eye exam included findings of eyelids

      normal to inspection, Pupils equally round and reactive to light,

      Extraocular muscles intact.

ENT: ENT exam normal, Pharynx exam normal, Uvula exam normal,

      Tonsil exam normal.

NECK: Neck exam normal, Neck exam included findings of normal

      range of motion, Trachea midline.

RESPIRATORY CHEST: Respiratory and chest exam normal, Chest exam

      included findings of chest movement symmetrical, Chest expansion

      equal, Percussion normal.

CARDIOVASCULAR: Cardiovascular assessment normal, Cardiovascular

      exam included findings of heart rate regular rate and rhythm, Heart

      sounds normal.

ABDOMEN FEMALE: Abdominal exam normal, Abdominal exam included

      findings of abdomen nontender, Bowel sounds normal.

BACK: Back exam normal.

UPPER EXTREMITY: Upper extremity exam normal, Upper extremity

      exam included findings of inspection normal, Range of motion normal.

LOWER EXTREMITY: Lower extremity exam normal, Lower extremity

      exam included findings of inspection normal, Range of motion normal.

NEURO: Neuro exam normal.

SKIN: Skin exam normal.

PSYCHIATRIC: Psychiatric exam normal, Psychiatric exam included

      findings of patient oriented to person place and time, Normal affect,

      Judgment normal, Insight normal.



MEDICATION ADMINISTRATION SUMMARY



      Drug Name: fentaNYL (PF) intravenous, Dose Ordered: 100 mcg, Route:

      IV Push, Status: Given, Time: 21:28 2017,

      Drug Name: *Norco, Dose Ordered: 2 tab(s), Route: Oral, Status:

      Given, Time: 20:28 2017,

      Drug Name: GI COCKTAIL, Dose Ordered: 40 mL, Route: Oral, Status:

      Given, Time: 20:06 2017,

      Drug Name: Phenergan injection, Dose Ordered: 12.5 mg, Route: IV

      Push, Status: Given, Time: 19:45 2017, *Additional information

      available in notes, Detailed record available in Medication Service

      section.



DOCTOR NOTES

TEXT:  Normally healthy adult female with bipolar illness,

      having sharp and variable chest pain. Has been under a lot of stress.

      Pain central and into neck and left arm, but sharp and variable. Had

      it about 2 hours yesterday, resolved, then returned about 2 hours

      prior to arrival here. (19:59 SHAN)



&a-1R&a+25V*p+0X*n5480W*c202B*c15G*c2P*p-0X&a-25V&a+1R         Name: Rose Mary Chapman  : 1970 F46 MedRec: [id number]

                             AcctNum: [id number]

  Prepared:  22:21 by Interface                 Page 3 of 4

                                      pMD

                         [facility]

                                EMERGENCY RECORD



   Extensive workup negative. Pain increased with motion of the left

      arm and shoulder. Some radicular nature; may need evaluation for

      cervical radiculopathy. (21:51 SHAN)

DATA REVIEWED: Lab data reviewed, Xray data reviewed, Reviewed

      EKG. (21:51 SHAN)



PROBLEM LIST

  No recorded problems



DIAGNOSIS (21:53 SHAN)

FINAL: PRIMARY: atypical chest pain.



PRESCRIPTION (21:54 SHAN)

Flexeril:  TABLET : 10 mg : ORAL : Quantity: *** 1 *** Unit:

      tab(s) Route: ORAL Schedule: every 8 hours PRN Dispense: *** 50 ***

      May substitute. Refills: *** No Refills ***.

NOTES:  No Refills.



DISPOSITION

PATIENT:  Disposition Type: Discharge, Disposition: *Discharge

      Home. (21:53 GIA)

   Patient left the department. (22:18 RICKY)

Key:

  RICKY=ERMA Temple, Terri NIELSEN=MD Usama, Eric























































&a-1R&a+25V*p+0X*g2095C*c202B*c15G*c2P*p-0X&a-25V&a+1R         Name: Adela 
Rose Mary D  : 1970 F46 MedRec: [id number]

                             AcctNum: [id number]

  Prepared:  22:21 by Interface                 Page 4 of 4

                                      pMD

MTDD

## 2017-05-02 ENCOUNTER — HOSPITAL ENCOUNTER (OUTPATIENT)
Dept: HOSPITAL 18 - NAV RAD | Age: 47
Discharge: HOME | End: 2017-05-02
Payer: COMMERCIAL

## 2017-05-02 DIAGNOSIS — M43.17: ICD-10-CM

## 2017-05-02 DIAGNOSIS — Z02.71: Primary | ICD-10-CM

## 2017-05-02 PROCEDURE — 72100 X-RAY EXAM L-S SPINE 2/3 VWS: CPT

## 2017-05-02 NOTE — RAD
THREE VIEWS LUMBOSACRAL SPINE:

 

Comparison: 5-26-16

 

History: Low back pain. 

 

FINDINGS: 

Three views of the lumbosacral spine shows grade II anterolisthesis of L5 on S1. There may be pars d
efects at L5. The other vertebral bodies demonstrate normal height and alignment without fracture or
 other areas of subluxation. Posterior facet arthrosis is seen in the lower lumbosacral spine. 

 

IMPRESSION: 

Stable spondylolisthesis of L5 on S1. 

 

POS: SUSY

## 2017-10-24 ENCOUNTER — HOSPITAL ENCOUNTER (EMERGENCY)
Dept: HOSPITAL 18 - NAV ERS | Age: 47
Discharge: HOME | End: 2017-10-24
Payer: SELF-PAY

## 2017-10-24 DIAGNOSIS — N39.0: ICD-10-CM

## 2017-10-24 DIAGNOSIS — F12.10: ICD-10-CM

## 2017-10-24 DIAGNOSIS — F41.9: ICD-10-CM

## 2017-10-24 DIAGNOSIS — I10: Primary | ICD-10-CM

## 2017-10-24 DIAGNOSIS — F17.210: ICD-10-CM

## 2017-10-24 DIAGNOSIS — G47.9: ICD-10-CM

## 2017-10-24 DIAGNOSIS — Z79.899: ICD-10-CM

## 2017-10-24 DIAGNOSIS — F31.9: ICD-10-CM

## 2017-10-24 LAB
ALBUMIN SERPL BCG-MCNC: 4.8 G/DL (ref 3.5–5)
ALP SERPL-CCNC: 131 U/L (ref 40–150)
ALT SERPL W P-5'-P-CCNC: 15 U/L (ref 8–55)
ANION GAP SERPL CALC-SCNC: 19 MMOL/L (ref 10–20)
AST SERPL-CCNC: 18 U/L (ref 5–34)
BACTERIA UR QL AUTO: (no result) HPF
BASOPHILS # BLD AUTO: 0.1 THOU/UL (ref 0–0.2)
BASOPHILS NFR BLD AUTO: 1 % (ref 0–1)
BILIRUB SERPL-MCNC: 0.4 MG/DL (ref 0.2–1.2)
BUN SERPL-MCNC: 8 MG/DL (ref 7–18.7)
CALCIUM SERPL-MCNC: 9.9 MG/DL (ref 7.8–10.44)
CHLORIDE SERPL-SCNC: 100 MMOL/L (ref 98–107)
CK MB SERPL-MCNC: 0.9 NG/ML (ref 0–6.6)
CO2 SERPL-SCNC: 17 MMOL/L (ref 22–29)
CREAT CL PREDICTED SERPL C-G-VRATE: 0 ML/MIN (ref 70–130)
DRUG SCREEN CUTOFF: (no result)
EOSINOPHIL # BLD AUTO: 0.1 THOU/UL (ref 0–0.7)
EOSINOPHIL NFR BLD AUTO: 1 % (ref 0–10)
GLOBULIN SER CALC-MCNC: 3.5 G/DL (ref 2.4–3.5)
GLUCOSE SERPL-MCNC: 100 MG/DL (ref 70–105)
HGB BLD-MCNC: 13.2 G/DL (ref 12–16)
LYMPHOCYTES # BLD AUTO: 3 THOU/UL (ref 1.2–3.4)
LYMPHOCYTES NFR BLD AUTO: 22.8 % (ref 21–51)
MCH RBC QN AUTO: 31.5 PG (ref 27–31)
MCV RBC AUTO: 95.7 FL (ref 81–99)
MEDTOX CONTROL LINE VALID?: (no result)
MONOCYTES # BLD AUTO: 0.9 THOU/UL (ref 0.11–0.59)
MONOCYTES NFR BLD AUTO: 7.1 % (ref 0–10)
NEUTROPHILS # BLD AUTO: 8.9 THOU/UL (ref 1.4–6.5)
NEUTROPHILS NFR BLD AUTO: 68.1 % (ref 42–75)
PLATELET # BLD AUTO: 348 THOU/UL (ref 130–400)
POTASSIUM SERPL-SCNC: 4 MMOL/L (ref 3.5–5.1)
PREGU CONTROL BACKGROUND?: (no result)
PREGU CONTROL BAR APPEAR?: YES
RBC # BLD AUTO: 4.18 MILL/UL (ref 4.2–5.4)
SODIUM SERPL-SCNC: 132 MMOL/L (ref 136–145)
SP GR UR STRIP: 1.01 (ref 1–1.03)
TROPONIN I SERPL DL<=0.01 NG/ML-MCNC: (no result) NG/ML (ref ?–0.03)
URNS CMNT MICRO: NO
WBC # BLD AUTO: 13.1 THOU/UL (ref 4.8–10.8)
WBC UR QL AUTO: (no result) HPF (ref 0–3)

## 2017-10-24 PROCEDURE — 93005 ELECTROCARDIOGRAM TRACING: CPT

## 2017-10-24 PROCEDURE — 85025 COMPLETE CBC W/AUTO DIFF WBC: CPT

## 2017-10-24 PROCEDURE — 96375 TX/PRO/DX INJ NEW DRUG ADDON: CPT

## 2017-10-24 PROCEDURE — 81015 MICROSCOPIC EXAM OF URINE: CPT

## 2017-10-24 PROCEDURE — 81025 URINE PREGNANCY TEST: CPT

## 2017-10-24 PROCEDURE — 96376 TX/PRO/DX INJ SAME DRUG ADON: CPT

## 2017-10-24 PROCEDURE — 81003 URINALYSIS AUTO W/O SCOPE: CPT

## 2017-10-24 PROCEDURE — 80053 COMPREHEN METABOLIC PANEL: CPT

## 2017-10-24 PROCEDURE — 70450 CT HEAD/BRAIN W/O DYE: CPT

## 2017-10-24 PROCEDURE — 82553 CREATINE MB FRACTION: CPT

## 2017-10-24 PROCEDURE — 96374 THER/PROPH/DIAG INJ IV PUSH: CPT

## 2017-10-24 PROCEDURE — 96361 HYDRATE IV INFUSION ADD-ON: CPT

## 2017-10-24 PROCEDURE — 80306 DRUG TEST PRSMV INSTRMNT: CPT

## 2017-10-24 PROCEDURE — 84484 ASSAY OF TROPONIN QUANT: CPT

## 2017-10-24 NOTE — CT
HEAD CT WITHOUT CONTRAST:

 

Date: 10-24-17 

 

Comparison: 10-11-04

 

History: Dizziness, headache, vomiting, high blood pressure. 

 

Technique: Serial axial CT imaging at 5 mm intervals from vertex through skull base without contrast
. 

 

FINDINGS: 

The imaged paranasal sinuses and mastoid air cells are well aerated. There is no displaced calvarial
 fracture. 

 

No intracranial hemorrhage, midline shift, or ventricular enlargement. 

 

IMPRESSION: 

No intracranial hemorrhage or displaced calvarial fracture. 

 

POS: HUBER

## 2017-10-28 ENCOUNTER — HOSPITAL ENCOUNTER (EMERGENCY)
Dept: HOSPITAL 18 - NAV ERS | Age: 47
End: 2017-10-28
Payer: SELF-PAY

## 2017-10-28 ENCOUNTER — HOSPITAL ENCOUNTER (OUTPATIENT)
Dept: HOSPITAL 92 - ERS | Age: 47
Setting detail: OBSERVATION
LOS: 1 days | Discharge: HOME | End: 2017-10-29
Attending: HOSPITALIST | Admitting: HOSPITALIST
Payer: SELF-PAY

## 2017-10-28 VITALS — BODY MASS INDEX: 23.7 KG/M2

## 2017-10-28 DIAGNOSIS — F39: ICD-10-CM

## 2017-10-28 DIAGNOSIS — F41.9: ICD-10-CM

## 2017-10-28 DIAGNOSIS — Z88.0: ICD-10-CM

## 2017-10-28 DIAGNOSIS — M62.81: ICD-10-CM

## 2017-10-28 DIAGNOSIS — G47.9: ICD-10-CM

## 2017-10-28 DIAGNOSIS — Z91.041: ICD-10-CM

## 2017-10-28 DIAGNOSIS — Z90.710: ICD-10-CM

## 2017-10-28 DIAGNOSIS — F31.9: ICD-10-CM

## 2017-10-28 DIAGNOSIS — R53.1: ICD-10-CM

## 2017-10-28 DIAGNOSIS — Z88.5: ICD-10-CM

## 2017-10-28 DIAGNOSIS — Z88.6: ICD-10-CM

## 2017-10-28 DIAGNOSIS — F12.10: ICD-10-CM

## 2017-10-28 DIAGNOSIS — Z98.890: ICD-10-CM

## 2017-10-28 DIAGNOSIS — R44.1: ICD-10-CM

## 2017-10-28 DIAGNOSIS — Z79.899: ICD-10-CM

## 2017-10-28 DIAGNOSIS — R51: Primary | ICD-10-CM

## 2017-10-28 DIAGNOSIS — I10: ICD-10-CM

## 2017-10-28 DIAGNOSIS — J06.9: ICD-10-CM

## 2017-10-28 DIAGNOSIS — F17.210: ICD-10-CM

## 2017-10-28 LAB
ALBUMIN SERPL BCG-MCNC: 4.1 G/DL (ref 3.5–5)
ALP SERPL-CCNC: 111 U/L (ref 40–150)
ALT SERPL W P-5'-P-CCNC: 11 U/L (ref 8–55)
ANION GAP SERPL CALC-SCNC: 12 MMOL/L (ref 10–20)
APTT PPP: 28.1 SEC (ref 22.9–36.1)
AST SERPL-CCNC: 18 U/L (ref 5–34)
BASOPHILS # BLD AUTO: 0.2 THOU/UL (ref 0–0.2)
BASOPHILS NFR BLD AUTO: 1.8 % (ref 0–1)
BILIRUB SERPL-MCNC: 0.2 MG/DL (ref 0.2–1.2)
BUN SERPL-MCNC: 7 MG/DL (ref 7–18.7)
CALCIUM SERPL-MCNC: 8.9 MG/DL (ref 7.8–10.44)
CHLORIDE SERPL-SCNC: 103 MMOL/L (ref 98–107)
CO2 SERPL-SCNC: 24 MMOL/L (ref 22–29)
CREAT CL PREDICTED SERPL C-G-VRATE: 0 ML/MIN (ref 70–130)
EOSINOPHIL # BLD AUTO: 0.4 THOU/UL (ref 0–0.7)
EOSINOPHIL NFR BLD AUTO: 4.4 % (ref 0–10)
GLOBULIN SER CALC-MCNC: 2.7 G/DL (ref 2.4–3.5)
GLUCOSE SERPL-MCNC: 77 MG/DL (ref 70–105)
HGB BLD-MCNC: 11.5 G/DL (ref 12–16)
INR PPP: 1
LAVENDER: (no result)
LYMPHOCYTES # BLD AUTO: 2.7 THOU/UL (ref 1.2–3.4)
LYMPHOCYTES NFR BLD AUTO: 28.6 % (ref 21–51)
Lab: (no result)
MCH RBC QN AUTO: 32.6 PG (ref 27–31)
MCV RBC AUTO: 95.8 FL (ref 81–99)
MONOCYTES # BLD AUTO: 0.9 THOU/UL (ref 0.11–0.59)
MONOCYTES NFR BLD AUTO: 8.8 % (ref 0–10)
NEUTROPHILS # BLD AUTO: 5.4 THOU/UL (ref 1.4–6.5)
NEUTROPHILS NFR BLD AUTO: 56.4 % (ref 42–75)
PLATELET # BLD AUTO: 265 THOU/UL (ref 130–400)
POTASSIUM SERPL-SCNC: 3.7 MMOL/L (ref 3.5–5.1)
PROTHROMBIN TIME: 13.3 SEC (ref 12–14.7)
RBC # BLD AUTO: 3.53 MILL/UL (ref 4.2–5.4)
RBC # CSF MANUAL: 1 /CUMM
SODIUM SERPL-SCNC: 135 MMOL/L (ref 136–145)
SPECIMEN SOURCE FLD: (no result)
WBC # BLD AUTO: 9.6 THOU/UL (ref 4.8–10.8)
WBC # CSF MANUAL: 1 /CUMM (ref 0–5)

## 2017-10-28 PROCEDURE — 36415 COLL VENOUS BLD VENIPUNCTURE: CPT

## 2017-10-28 PROCEDURE — 81003 URINALYSIS AUTO W/O SCOPE: CPT

## 2017-10-28 PROCEDURE — 85025 COMPLETE CBC W/AUTO DIFF WBC: CPT

## 2017-10-28 PROCEDURE — 96361 HYDRATE IV INFUSION ADD-ON: CPT

## 2017-10-28 PROCEDURE — 84157 ASSAY OF PROTEIN OTHER: CPT

## 2017-10-28 PROCEDURE — 80053 COMPREHEN METABOLIC PANEL: CPT

## 2017-10-28 PROCEDURE — 96365 THER/PROPH/DIAG IV INF INIT: CPT

## 2017-10-28 PROCEDURE — 96375 TX/PRO/DX INJ NEW DRUG ADDON: CPT

## 2017-10-28 PROCEDURE — 85730 THROMBOPLASTIN TIME PARTIAL: CPT

## 2017-10-28 PROCEDURE — G0378 HOSPITAL OBSERVATION PER HR: HCPCS

## 2017-10-28 PROCEDURE — 89051 BODY FLUID CELL COUNT: CPT

## 2017-10-28 PROCEDURE — 80306 DRUG TEST PRSMV INSTRMNT: CPT

## 2017-10-28 PROCEDURE — 82945 GLUCOSE OTHER FLUID: CPT

## 2017-10-28 PROCEDURE — 96372 THER/PROPH/DIAG INJ SC/IM: CPT

## 2017-10-28 PROCEDURE — 96374 THER/PROPH/DIAG INJ IV PUSH: CPT

## 2017-10-28 PROCEDURE — 84443 ASSAY THYROID STIM HORMONE: CPT

## 2017-10-28 PROCEDURE — 96376 TX/PRO/DX INJ SAME DRUG ADON: CPT

## 2017-10-28 PROCEDURE — 62270 DX LMBR SPI PNXR: CPT

## 2017-10-28 PROCEDURE — 85610 PROTHROMBIN TIME: CPT

## 2017-10-28 PROCEDURE — 70450 CT HEAD/BRAIN W/O DYE: CPT

## 2017-10-28 PROCEDURE — 82140 ASSAY OF AMMONIA: CPT

## 2017-10-28 NOTE — CT
EXAM:

NONCONTRAST HEAD CT

10/28/17

 

COMPARISON:  

10/24/17

 

HISTORY: 

Dizziness. Headache. Hypertension. 

 

TECHNIQUE:  

Noncontrast head CT is performed from skull base to skull vertex. 

 

FINDINGS:  

No parenchymal hemorrhage. No extra-axial hematoma. No midline shift. Basilar cisterns are patent. B
rain volume, age appropriate. Cortical gray-white matter differentiation is preserved. 

 

The ventricles and sulci are patent and symmetric. The calvarium is intact. Minimal mucosal thickeni
ng of the ethmoid air cells, right sphenoid sinus, and right maxillary sinus. Adequate mastoid air c
ell aeration.

 

IMPRESSION:  

No acute intracranial process. 

 

POS: [facility]

## 2017-10-29 VITALS — TEMPERATURE: 98.6 F

## 2017-10-29 VITALS — SYSTOLIC BLOOD PRESSURE: 140 MMHG | DIASTOLIC BLOOD PRESSURE: 80 MMHG

## 2017-10-29 LAB
ALP SERPL-CCNC: 107 U/L (ref 40–150)
ALT SERPL W P-5'-P-CCNC: 15 U/L (ref 8–55)
ANION GAP SERPL CALC-SCNC: 11 MMOL/L (ref 10–20)
AST SERPL-CCNC: 19 U/L (ref 5–34)
BASOPHILS # BLD AUTO: 0 THOU/UL (ref 0–0.2)
BASOPHILS NFR BLD AUTO: 0.3 % (ref 0–1)
BILIRUB SERPL-MCNC: 0.2 MG/DL (ref 0.2–1.2)
BUN SERPL-MCNC: 6 MG/DL (ref 7–18.7)
CALCIUM SERPL-MCNC: 9.2 MG/DL (ref 7.8–10.44)
CHLORIDE SERPL-SCNC: 109 MMOL/L (ref 98–107)
CO2 SERPL-SCNC: 23 MMOL/L (ref 22–29)
CREAT CL PREDICTED SERPL C-G-VRATE: 0 ML/MIN (ref 70–130)
EOSINOPHIL # BLD AUTO: 0 THOU/UL (ref 0–0.7)
EOSINOPHIL NFR BLD AUTO: 0.5 % (ref 0–10)
GLOBULIN SER CALC-MCNC: 2.7 G/DL (ref 2.4–3.5)
HCT VFR BLD CALC: 33.3 % (ref 36–47)
HYALINE CASTS #/AREA URNS LPF: (no result) LPF
LYMPHOCYTES # BLD: 0.9 THOU/UL (ref 1.2–3.4)
LYMPHOCYTES NFR BLD AUTO: 13.8 % (ref 21–51)
MONOCYTES # BLD AUTO: 0.1 THOU/UL (ref 0.11–0.59)
MONOCYTES NFR BLD AUTO: 1.2 % (ref 0–10)
NEUTROPHILS # BLD AUTO: 5.4 THOU/UL (ref 1.4–6.5)
RBC # BLD AUTO: 3.45 MILL/UL (ref 4.2–5.4)
WBC # BLD AUTO: 6.5 THOU/UL (ref 4.8–10.8)

## 2017-10-29 NOTE — DIS
DATE OF ADMISSION:  10/29/2017

 

DATE OF DISCHARGE:  10/29/2017

 

DISCHARGE DISPOSITION:  To home.

 

PRIMARY DISCHARGE DIAGNOSES:  Headache with upper respiratory infection, likely 
viral with sinusitis, marijuana abuse, and hypertension.

 

PROCEDURES DONE DURING HOSPITALIZATION:  H and H 11 and 33 and platelet count 
268.  TSH 2.2.  Urine drug screen is positive for benzodiazepines.  CT brain 
done on 10/28/2017 showed no acute intracranial process.  Lumbar puncture done 
on 02/01, shows it to be clear and colorless, white count of 1, RBC 1, had a  
glucose of 57 and total protein of 35.  Her urine drug screen was positive for 
marijuana on 10/24/2017.

 

DISCHARGE MEDICATIONS:  Zoloft 100 mg p.o. daily, buspirone 15 mg p.o. twice 
daily, carbamazepine 300 mg p.o. twice daily, Mucinex 600 mg p.o. twice daily, 
Levaquin 500 mg p.o. daily for 6 days, hydrochlorothiazide 12.5 mg p.o. daily, 
gabapentin 300 mg p.o. twice daily, albuterol inhaler q.6 hourly p.r.n.

 

ALLERGIES:  MORPHINE, NSAIDS, IODINE, PENICILLIN.

 

BRIEF COURSE DURING HOSPITALIZATION:  Patient initially came in with complaints 
of headache and had also complained of left upper and lower extremity weakness.
  The patient has had cough with nasal stuffiness and postnasal discharge as 
well.  She has had an initial lumbar puncture done, which did not reveal any 
acute abnormality.  She has had a CT brain done, which showed no acute 
intracranial abnormality.  The patient was scheduled for MRI brain, but she did 
not want to do it despite preprocedure Ativan.  Likely, her headache is due to 
upper respiratory infection with sinusitis.  She was counseled with regard to 
cessation of smoking and marijuana.  She needs to continue Levaquin, Mucinex, 
and Tylenol to help with her sinusitis.  If her symptoms were to get worse, she 
is advised to come to the nearest emergency room.  She is otherwise 
hemodynamically stable and neurologically stable.  She is ambulating in the 
room.  Please see a face-to-face documentation on [employer] for the day of 
discharge.

 

[facility]

## 2017-10-29 NOTE — HP
DATE OF ADMISSION:  10/29/2017

 

CHIEF COMPLAINT:  Headache.

 

HISTORY OF PRESENT ILLNESS:  Patient is a 47 years old female with past medical history of hypertens
ion, initially went to outside ER because of the headache and the left upper and lower extremity wea
kness.  The patient's blood pressure at home was elevated and she has annoying headache.  Headache i
s dull kind, constant all over the head, associated with nausea and vomiting also, severe headache. 
 Patient went to outside ER.  The patient was given Decadron.  Following the Decadron, the patient w
as on Benadryl.  The patient became confused, so patient was transferred here for further evaluation
.  By the time, the patient came to the ER, the confusion did improve and the patient was given Zofr
an for nausea and Tylenol for the headache and the headache did improve in our ER.  Denies any pain 
at this time.  Patient says she did have some left upper and lower extremity weakness with the heada
puneet, but resolved at this time.  Denies any trouble breathing, denies any chest pain, denies any pal
pitation, denies dizziness.  The patient complains of cough for the past few days, positive for some
 sputum production.

 

PAST MEDICAL HISTORY:  Hypertension.

 

PAST SURGICAL HISTORY:  Tonsillectomy, hysterectomy, and hand surgery.

 

SOCIAL HISTORY:  Positive for smoking, denies alcohol, denies any drugs.

 

FAMILY HISTORY:  Positive for stroke.

 

REVIEW OF SYSTEMS:  Constitutional:  Denies any fever, denies any chills.  Eyes:  No vision problems
.  Ears:  Denies any hearing loss.  Neck:  Denies any neck pain.  Cardiovascular System:  Denies any
 chest pain.  Respiratory System:  Positive for cough.  Cranial Nerve System:  Denies syncope.  Posi
tive for headache.  Positive for left upper and lower extremity weakness.  Integumentary:  Denies an
y rash.  Psychiatric:  Denies depression/anxiety.  Genitourinary:  No any dysuria.  All other review
 of systems is reviewed and is negative.

 

PHYSICAL EXAMINATION:

CONSTITUTIONAL/VITAL SIGNS:  At the time of H\T\P performed, temperature 98.2, heart rate 77, respir
ations 16, blood pressure is 122/69.

GENERAL:  The patient appears comfortable.

HEENT:  Anterior nares patent.  Nose normal.  Ears normal.  Teeth intact.  Tongue is moist.

NECK:  Supple.  No JVD.

CARDIOVASCULAR SYSTEM:  S1, S2 present.  Regular rate and rhythm.  No murmurs, no rubs, no gallops.

RESPIRATORY SYSTEM:  No wheezing, no rhonchi.  Breath sounds present bilaterally.

GASTROINTESTINAL:  Abdomen is soft, nontender, no guarding, no organomegaly, no masses felt.

MUSCULOSKELETAL:  No edema.

INTEGUMENT:  No rashes seen.

CRANIAL NERVE SYSTEM:  Cranial nerves intact.  Speech clear.

PSYCHIATRIC:  Mood is appropriate at this time.  Strength intact.

 

LABORATORY DATA:  At the time of H\T\P performed, white count 6.5, hemoglobin 9.3, platelet count 26
8.  BMP showed sodium 139, potassium 4, chloride 109, CO2 of 23, BUN of 16.6, AST 19, ALT 15, alkali
ne phosphatase 107, serum total protein 6.6.  Toxicology is positive for benzos.  UA positive for sp
ecific gravity 1.003.

 

CT head, no acute disease in the outside ER.

 

ASSESSMENT AND PLAN:  The patient is a 47 years old female.

1.  Transient ischemic attack.  Plan to do MRI brain.  Plan to check carotid ultrasound.  We will mo
nitor the patient closely.

2.  History of hypertension plus headache.  Headache resolved at this time.  Plan to continue blood 
pressure medications.

3.  Pain, p.r.n. pain medications.

4.  Nausea, p.r.n. antiemetics.

 

The case was discussed in detail with the patient.

## 2017-10-29 NOTE — PDOC.EVN
Event Note





- Event Note


Event Note: 





[id number]


h&p dictated


1. TIA


2. HTN


3. Headache





plan:


see orders

## 2017-10-29 NOTE — PDOC.PN
- Subjective


Encounter Start Date: 10/29/17


Encounter Start Time: 09:00


Subjective: has nasal stuffiness and dry cough


-: no specific weakness





- Objective


MAR Reviewed: Yes


Vital Signs & Weight: 


 Vital Signs (12 hours)











  Temp Pulse Resp BP Pulse Ox


 


 10/29/17 07:56  98.6 F  72  18  


 


 10/29/17 07:20  98.6 F  72  18  129/73  91 L


 


 10/29/17 04:20  98.2 F  77  16  


 


 10/29/17 03:35  98.2 F  77  16  122/69  94 L








 Weight











Weight                         138 lb 4.8 oz














I&O: 


 











 10/28/17 10/29/17 10/30/17





 06:59 06:59 06:59


 


Intake Total  403 


 


Balance  403 











Result Diagrams: 


 10/29/17 02:42





 10/29/17 02:42





Phys Exam





- Physical Examination


HEENT: PERRLA, moist MMs


Neck: no JVD, supple


Respiratory: no wheezing, no rales


Cardiovascular: RRR, no significant murmur


Gastrointestinal: soft, non-tender, positive bowel sounds


Musculoskeletal: no edema, pulses present


Neurological: non-focal, moves all 4 limbs


Psychiatric: A&O x 3





Dx/Plan


(1) Headache


Code(s): R51 - HEADACHE   Status: Acute   


Qualifiers: 


   Headache type: unspecified   Headache chronicity pattern: acute headache 





(2) URI (upper respiratory infection)


Code(s): J06.9 - ACUTE UPPER RESPIRATORY INFECTION, UNSPECIFIED   Status: Acute

   


Qualifiers: 


   URI type: acute nasopharyngitis (common cold)   Qualified Code(s): J00 - 

Acute nasopharyngitis [common cold]   





(3) Acute sinusitis


Code(s): J01.90 - ACUTE SINUSITIS, UNSPECIFIED   Status: Acute   


Qualifiers: 


   Sinusitis location: unspecified location 





(4) Marijuana abuse


Code(s): F12.10 - CANNABIS ABUSE, UNCOMPLICATED   Status: Chronic   





(5) HTN (hypertension)


Code(s): I10 - ESSENTIAL (PRIMARY) HYPERTENSION   Status: Chronic   


Qualifiers: 


   Hypertension type: essential hypertension   Qualified Code(s): I10 - 

Essential (primary) hypertension   





- Plan


oral levaquin with tylenol (not allergic)


-: nasal saline drops, alb inhaler


-: if MRI is -ve may go home





* .








Review of Systems





- Medications/Allergies


Allergies/Adverse Reactions: 


 Allergies











Allergy/AdvReac Type Severity Reaction Status Date / Time


 


iodine Allergy   Verified 03/09/15 06:52


 


morphine Allergy   Verified 03/09/15 06:52


 


NSAIDS (Non-Steroidal Allergy   Verified 03/09/15 06:53





Anti-Inflamma     


 


Penicillins Allergy   Verified 03/09/15 06:54











Medications: 


 Current Medications





Acetaminophen (Tylenol)  650 mg PO Q4H PRN


   PRN Reason: Headache/Fever or Pain


   Stop: 10/29/17 15:00


Clonidine (Catapres)  0.1 mg PO Q6H PRN


   PRN Reason: SBP GREATER THAN 160


Lactated Ringer's (Lactated Ringer's)  1,000 mls @ 75 mls/hr IV .[id number] DONTA


   Stop: 10/29/17 15:00


   Last Admin: 10/29/17 03:57 Dose:  1,000 mls


Lorazepam (Ativan)  0.5 mg PO WILLCALL DONTA


   Stop: 10/29/17 12:00


   Last Admin: 10/29/17 09:15 Dose:  0.5 mg


Ondansetron HCl (Zofran)  4 mg IVP Q6H PRN


   PRN Reason: Nausea/Vomiting


   Stop: 10/29/17 15:00


Ondansetron HCl (Zofran Odt)  4 mg SL Q6H PRN


   PRN Reason: Nausea/Vomiting


   Stop: 10/29/17 15:00


Sodium Chloride (Flush - Normal Saline)  10 ml IVF PRN PRN


   PRN Reason: Saline Flush


   Stop: 10/29/17 15:00

## 2018-01-28 ENCOUNTER — HOSPITAL ENCOUNTER (EMERGENCY)
Dept: HOSPITAL 18 - NAV ERS | Age: 48
Discharge: HOME | End: 2018-01-28
Payer: COMMERCIAL

## 2018-01-28 DIAGNOSIS — F17.210: ICD-10-CM

## 2018-01-28 DIAGNOSIS — I10: ICD-10-CM

## 2018-01-28 DIAGNOSIS — S09.93XA: ICD-10-CM

## 2018-01-28 DIAGNOSIS — Z79.899: ICD-10-CM
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
## 2018-01-28 DIAGNOSIS — S01.81XA: Primary | ICD-10-CM

## 2018-01-28 PROCEDURE — 12013 RPR F/E/E/N/L/M 2.6-5.0 CM: CPT

## 2018-01-28 PROCEDURE — 70110 X-RAY EXAM OF JAW 4/> VIEWS: CPT

## 2018-01-28 NOTE — RAD
MANDIBLE:

 

Date:  01/28/18 

 

HISTORY:  

Trauma. Laceration to chin and pain. 

 

FINDINGS:

AP, lateral, and oblique views mandible obtained. The mandible is intact. No evidence of mandibular f
racture seen. 

 

IMPRESSION: 

No evidence of mandibular fracture. 

 

 

POS: [facility]

## 2018-11-13 ENCOUNTER — HOSPITAL ENCOUNTER (OUTPATIENT)
Dept: HOSPITAL 92 - SDC/OP | Age: 48
Discharge: HOME | End: 2018-11-13
Attending: FAMILY MEDICINE
Payer: COMMERCIAL

## 2018-11-13 VITALS — BODY MASS INDEX: 24.3 KG/M2

## 2018-11-13 DIAGNOSIS — M43.17: ICD-10-CM

## 2018-11-13 DIAGNOSIS — Z88.0: ICD-10-CM

## 2018-11-13 DIAGNOSIS — Z88.6: ICD-10-CM

## 2018-11-13 DIAGNOSIS — M51.36: Primary | ICD-10-CM

## 2018-11-13 DIAGNOSIS — Z88.5: ICD-10-CM

## 2018-11-13 DIAGNOSIS — Z79.899: ICD-10-CM

## 2018-11-13 DIAGNOSIS — Z91.041: ICD-10-CM

## 2018-11-13 LAB — CREAT CL PREDICTED SERPL C-G-VRATE: 109 ML/MIN (ref 70–130)

## 2018-11-13 PROCEDURE — 36415 COLL VENOUS BLD VENIPUNCTURE: CPT

## 2018-11-13 PROCEDURE — 72158 MRI LUMBAR SPINE W/O & W/DYE: CPT

## 2018-11-13 PROCEDURE — 96374 THER/PROPH/DIAG INJ IV PUSH: CPT

## 2018-11-13 PROCEDURE — 82565 ASSAY OF CREATININE: CPT

## 2018-11-13 NOTE — MRI
MRI OF THE LUMBAR SPINE WITH AND WITHOUT CONTRAST: 

11/13/18

 

HISTORY: 

Lumbar disc degeneration.

 

COMPARISON:  

10/3/02.

 

TECHNIQUE:  

MRI of the lumbar spine is performed with and without intravenous gadolinium administration. Multiseq
uential, multiplanar imaging is performed.

 

FINDINGS:  

Appropriate T1 narrow signal intensity of the lumbar vertebrae. Lumbar spine vertebral body height is
 maintained and there is no fracture. There are bilateral pars defects at L5. There is 9 mm of britni
listhesis of L5 upon S1. On the postcontrast images, no abnormal enhancement of the vertebral bodies.
 No abnormal enhancement within the thecal sac including the cauda equina and the conus medullaris. 

 

Conus medullaris terminates at the mid T12 level. 

 

Symmetric signal intensity of the psoas muscles and visualized solid organs.

 

T11-T12 and T12-L1: No significant central canal stenosis or foraminal narrowing. 

 

L1-L2: Adequate disc hydration. No significant central canal stenosis. Neural foramina are patent parminder
aterally.

 

L2-L3: Minimal desiccation and minimal loss of disc space height. There is a T2 and STIR hyperintensi
ty lesion with associated enhancement involving the posterior and midline and subarticular aspect of 
the disc space compatible with an annular fissure. Annular fissure abuts the traversing right L3 nerv
e root. Disc material  abuts the traversing left L3 nerve root. Overall, no significant central canal
 stenosis. Neural foramina are patent bilaterally. 

 

L3-L4: Adequate disc hydration. No significant central canal stenosis. Right neural foramen is mild t
o moderately narrowed. Moderate left foraminal narrowing. Trace amount of fluid in both facet joints.
 

 

L4-L5: Adequate disc hydration. No significant central canal stenosis. Neural foramina are patent. 

 

L5-S1: Severe loss of disc space height. No significant central canal stenosis. Moderate to severe ri
ght and left foraminal narrowing. 

 

IMPRESSION:  

1.      Annular fissure at L2-L3 as described above. 

2.      Bilateral pars defects (spondylolysis) with associated spondylolisthesis at L5-S1. No signifi
cant central canal stenosis. There is moderate to severe bilateral foraminal narrowing.

 

POS: [facility]

## 2018-12-11 ENCOUNTER — HOSPITAL ENCOUNTER (EMERGENCY)
Dept: HOSPITAL 18 - NAV ERS | Age: 48
Discharge: HOME | End: 2018-12-11
Payer: COMMERCIAL

## 2018-12-11 DIAGNOSIS — M47.9: ICD-10-CM

## 2018-12-11 DIAGNOSIS — J06.9: Primary | ICD-10-CM

## 2018-12-11 DIAGNOSIS — Z79.899: ICD-10-CM

## 2018-12-11 DIAGNOSIS — F43.10: ICD-10-CM

## 2018-12-11 DIAGNOSIS — F31.9: ICD-10-CM

## 2018-12-11 DIAGNOSIS — I10: ICD-10-CM

## 2018-12-11 PROCEDURE — 99283 EMERGENCY DEPT VISIT LOW MDM: CPT

## 2021-12-08 ENCOUNTER — HOSPITAL ENCOUNTER (OUTPATIENT)
Dept: HOSPITAL 92 - TBSIIMAG | Age: 51
Discharge: HOME | End: 2021-12-08
Attending: NEUROLOGICAL SURGERY
Payer: COMMERCIAL

## 2021-12-08 DIAGNOSIS — M47.22: Primary | ICD-10-CM

## 2021-12-08 PROCEDURE — 72040 X-RAY EXAM NECK SPINE 2-3 VW: CPT

## 2022-08-23 ENCOUNTER — HOSPITAL ENCOUNTER (EMERGENCY)
Dept: HOSPITAL 18 - NAV ERS | Age: 52
Discharge: HOME | End: 2022-08-23
Payer: SELF-PAY

## 2022-08-23 DIAGNOSIS — Z79.899: ICD-10-CM

## 2022-08-23 DIAGNOSIS — S82.841A: Primary | ICD-10-CM

## 2022-08-23 DIAGNOSIS — E03.9: ICD-10-CM
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
## 2022-08-23 DIAGNOSIS — I10: ICD-10-CM

## 2022-08-23 DIAGNOSIS — F17.210: ICD-10-CM

## 2022-08-23 PROCEDURE — 72125 CT NECK SPINE W/O DYE: CPT

## 2022-08-23 PROCEDURE — 70450 CT HEAD/BRAIN W/O DYE: CPT

## 2022-08-23 PROCEDURE — 29515 APPLICATION SHORT LEG SPLINT: CPT

## 2022-09-04 ENCOUNTER — HOSPITAL ENCOUNTER (EMERGENCY)
Dept: HOSPITAL 18 - NAV ERS | Age: 52
Discharge: HOME | End: 2022-09-04
Payer: SELF-PAY
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
## 2022-09-04 DIAGNOSIS — I10: ICD-10-CM

## 2022-09-04 DIAGNOSIS — F17.210: ICD-10-CM

## 2022-09-04 DIAGNOSIS — E03.9: ICD-10-CM

## 2022-09-04 DIAGNOSIS — S82.841D: ICD-10-CM

## 2022-09-04 DIAGNOSIS — Z79.899: ICD-10-CM

## 2022-09-04 DIAGNOSIS — Z47.89: Primary | ICD-10-CM

## 2022-09-04 PROCEDURE — 99282 EMERGENCY DEPT VISIT SF MDM: CPT

## 2022-09-04 PROCEDURE — 96372 THER/PROPH/DIAG INJ SC/IM: CPT

## 2023-08-04 ENCOUNTER — HOSPITAL ENCOUNTER (EMERGENCY)
Dept: HOSPITAL 18 - NAV ERS | Age: 53
Discharge: HOME | End: 2023-08-04
Payer: SELF-PAY

## 2023-08-04 DIAGNOSIS — I10: ICD-10-CM

## 2023-08-04 DIAGNOSIS — F17.210: ICD-10-CM

## 2023-08-04 DIAGNOSIS — N64.4: Primary | ICD-10-CM

## 2023-08-04 DIAGNOSIS — Z79.899: ICD-10-CM

## 2023-08-04 DIAGNOSIS — E03.9: ICD-10-CM

## 2023-08-04 PROCEDURE — 99283 EMERGENCY DEPT VISIT LOW MDM: CPT

## 2023-08-09 ENCOUNTER — HOSPITAL ENCOUNTER (EMERGENCY)
Dept: HOSPITAL 18 - NAV ERS | Age: 53
End: 2023-08-09
Payer: SELF-PAY

## 2023-08-09 DIAGNOSIS — Z53.21: Primary | ICD-10-CM

## 2024-02-04 ENCOUNTER — HOSPITAL ENCOUNTER (EMERGENCY)
Dept: HOSPITAL 18 - NAV ERS | Age: 54
Discharge: HOME | End: 2024-02-04
Payer: SELF-PAY

## 2024-02-04 DIAGNOSIS — Z79.899: ICD-10-CM

## 2024-02-04 DIAGNOSIS — R11.2: ICD-10-CM

## 2024-02-04 DIAGNOSIS — E78.00: ICD-10-CM

## 2024-02-04 DIAGNOSIS — E03.9: ICD-10-CM

## 2024-02-04 DIAGNOSIS — R10.13: Primary | ICD-10-CM

## 2024-02-04 DIAGNOSIS — Z79.890: ICD-10-CM

## 2024-02-04 DIAGNOSIS — I10: ICD-10-CM

## 2024-02-04 DIAGNOSIS — Z55.6: ICD-10-CM

## 2024-02-04 DIAGNOSIS — F17.210: ICD-10-CM

## 2024-02-04 LAB
ALBUMIN SERPL BCG-MCNC: 4.1 G/DL (ref 3.5–5)
ALP SERPL-CCNC: 82 U/L (ref 40–110)
ALT SERPL W P-5'-P-CCNC: 11 U/L (ref 8–55)
ANION GAP SERPL CALC-SCNC: 15 MMOL/L (ref 10–20)
AST SERPL-CCNC: 16 U/L (ref 5–34)
BASOPHILS # BLD AUTO: 0.1 THOU/UL (ref 0–0.2)
BASOPHILS NFR BLD AUTO: 1.2 % (ref 0–1)
BILIRUB SERPL-MCNC: 0.2 MG/DL (ref 0.2–1.2)
BUN SERPL-MCNC: 13 MG/DL (ref 9.8–20.1)
CALCIUM SERPL-MCNC: 9.7 MG/DL (ref 7.8–10.44)
CHLORIDE SERPL-SCNC: 105 MMOL/L (ref 98–107)
CO2 SERPL-SCNC: 20 MMOL/L (ref 22–29)
CREAT CL PREDICTED SERPL C-G-VRATE: 0 ML/MIN (ref 70–130)
EOSINOPHIL # BLD AUTO: 0.3 THOU/UL (ref 0–0.7)
EOSINOPHIL NFR BLD AUTO: 4.2 % (ref 0–10)
GLOBULIN SER CALC-MCNC: 3 G/DL (ref 2.4–3.5)
GLUCOSE SERPL-MCNC: 190 MG/DL (ref 70–105)
HCT VFR BLD CALC: 36.2 % (ref 36–47)
HGB BLD-MCNC: 12.1 G/DL (ref 12–16)
LIPASE SERPL-CCNC: 18 U/L (ref 8–78)
LYMPHOCYTES # BLD AUTO: 1.9 THOU/UL (ref 1.2–3.4)
LYMPHOCYTES NFR BLD AUTO: 23.4 % (ref 21–51)
MCH RBC QN AUTO: 31.7 PG (ref 27–31)
MCV RBC AUTO: 95.1 FL (ref 78–98)
MONOCYTES # BLD AUTO: 0.4 THOU/UL (ref 0.11–0.59)
MONOCYTES NFR BLD AUTO: 5.3 % (ref 0–10)
NEUTROPHILS # BLD AUTO: 5.3 THOU/UL (ref 1.4–6.5)
NEUTROPHILS NFR BLD AUTO: 65.9 % (ref 42–75)
PLATELET # BLD AUTO: 273 10X3/UL (ref 130–400)
POTASSIUM SERPL-SCNC: 3.6 MMOL/L (ref 3.5–5.1)
RBC # BLD AUTO: 3.81 MILL/UL (ref 4.2–5.4)
SODIUM SERPL-SCNC: 136 MMOL/L (ref 136–145)
WBC # BLD AUTO: 8 10X3/UL (ref 4.8–10.8)

## 2024-02-04 PROCEDURE — 74177 CT ABD & PELVIS W/CONTRAST: CPT

## 2024-02-04 PROCEDURE — 96372 THER/PROPH/DIAG INJ SC/IM: CPT

## 2024-02-04 PROCEDURE — 83690 ASSAY OF LIPASE: CPT

## 2024-02-04 PROCEDURE — 85025 COMPLETE CBC W/AUTO DIFF WBC: CPT

## 2024-02-04 PROCEDURE — 96374 THER/PROPH/DIAG INJ IV PUSH: CPT

## 2024-02-04 PROCEDURE — 93005 ELECTROCARDIOGRAM TRACING: CPT

## 2024-02-04 PROCEDURE — 80053 COMPREHEN METABOLIC PANEL: CPT

## 2024-02-04 SDOH — EDUCATIONAL SECURITY - EDUCATION ATTAINMENT: PROBLEMS RELATED TO HEALTH LITERACY: Z55.6

## 2025-02-24 ENCOUNTER — HOSPITAL ENCOUNTER (EMERGENCY)
Dept: HOSPITAL 18 - NAV ERS | Age: 55
Discharge: HOME | End: 2025-02-24
Payer: COMMERCIAL

## 2025-02-24 DIAGNOSIS — E03.9: ICD-10-CM

## 2025-02-24 DIAGNOSIS — F17.210: ICD-10-CM
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
## 2025-02-24 DIAGNOSIS — Z79.899: ICD-10-CM

## 2025-02-24 DIAGNOSIS — I10: ICD-10-CM

## 2025-02-24 DIAGNOSIS — T82.594A: Primary | ICD-10-CM

## 2025-02-24 PROCEDURE — 99283 EMERGENCY DEPT VISIT LOW MDM: CPT
